# Patient Record
Sex: FEMALE | Race: BLACK OR AFRICAN AMERICAN | NOT HISPANIC OR LATINO | Employment: FULL TIME | URBAN - METROPOLITAN AREA
[De-identification: names, ages, dates, MRNs, and addresses within clinical notes are randomized per-mention and may not be internally consistent; named-entity substitution may affect disease eponyms.]

---

## 2018-12-15 ENCOUNTER — HOSPITAL ENCOUNTER (EMERGENCY)
Facility: OTHER | Age: 28
Discharge: HOME | End: 2018-12-15
Attending: EMERGENCY MEDICINE
Payer: MEDICAID

## 2018-12-15 VITALS
HEIGHT: 61 IN | RESPIRATION RATE: 18 BRPM | DIASTOLIC BLOOD PRESSURE: 78 MMHG | OXYGEN SATURATION: 99 % | WEIGHT: 125 LBS | BODY MASS INDEX: 23.6 KG/M2 | TEMPERATURE: 98 F | SYSTOLIC BLOOD PRESSURE: 120 MMHG | HEART RATE: 80 BPM

## 2018-12-15 DIAGNOSIS — S50.01XA CONTUSION OF RIGHT ELBOW, INITIAL ENCOUNTER: Primary | ICD-10-CM

## 2018-12-15 DIAGNOSIS — M79.603 ARM PAIN: ICD-10-CM

## 2018-12-15 LAB
B-HCG UR QL: NEGATIVE
CTP QC/QA: YES

## 2018-12-15 PROCEDURE — 81025 URINE PREGNANCY TEST: CPT | Performed by: EMERGENCY MEDICINE

## 2018-12-15 PROCEDURE — 25000003 PHARM REV CODE 250: Performed by: EMERGENCY MEDICINE

## 2018-12-15 PROCEDURE — 99283 EMERGENCY DEPT VISIT LOW MDM: CPT | Mod: 25

## 2018-12-15 RX ORDER — IBUPROFEN 400 MG/1
800 TABLET ORAL
Status: COMPLETED | OUTPATIENT
Start: 2018-12-15 | End: 2018-12-15

## 2018-12-15 RX ORDER — IBUPROFEN 600 MG/1
600 TABLET ORAL EVERY 6 HOURS PRN
Qty: 20 TABLET | Refills: 0 | OUTPATIENT
Start: 2018-12-15 | End: 2020-03-07

## 2018-12-15 RX ADMIN — IBUPROFEN 800 MG: 400 TABLET, FILM COATED ORAL at 03:12

## 2018-12-15 NOTE — ED PROVIDER NOTES
Encounter Date: 12/15/2018    SCRIBE #1 NOTE: IMichel, am scribing for, and in the presence of, Dr. Oneil.       History     Chief Complaint   Patient presents with    Arm Pain     pt reports falling today and hitting R elbow; pt reports swelling and pain and trouble moving R arm; denies LOC from fall; no bruising or skin abrasions noted     A 28-year-old female complains of a sudden onset of right elbow pain that started approximately 4 hr prior to arrival and has been constant after she had a fall out ice skating rink.  Describes as sharp pain worse with movement.  The still has good  strength.  Denies any other symptoms.          Review of patient's allergies indicates:  No Known Allergies  History reviewed. No pertinent past medical history.  History reviewed. No pertinent surgical history.  History reviewed. No pertinent family history.  Social History     Tobacco Use    Smoking status: Never Smoker    Smokeless tobacco: Never Used   Substance Use Topics    Alcohol use: Yes     Frequency: Never     Comment: occasional    Drug use: Not on file     Review of Systems   Constitutional: Negative for activity change and appetite change.   HENT: Negative for rhinorrhea.    Respiratory: Negative for shortness of breath.    Cardiovascular: Negative for chest pain.   Musculoskeletal:        Positive for right elbow pain       Physical Exam     Initial Vitals [12/15/18 0210]   BP Pulse Resp Temp SpO2   118/73 94 18 99.4 °F (37.4 °C) 100 %      MAP       --         Physical Exam    Nursing note and vitals reviewed.  Constitutional: She appears well-developed and well-nourished. She is not diaphoretic. No distress.   HENT:   Head: Normocephalic and atraumatic.   Eyes: Conjunctivae are normal.   Pulmonary/Chest: No respiratory distress.   Musculoskeletal: Normal range of motion. She exhibits tenderness. She exhibits no edema.   Tenderness to palpation of the lateral epicondyl. Holding arm in a  pronated position. Pain with supination and extension.  Good radial pulse.  Warm extremity.  Neurovascularly intact.   Neurological: She is alert and oriented to person, place, and time.   Skin: Skin is warm and dry. No rash and no abscess noted. No erythema. No pallor.   Psychiatric: She has a normal mood and affect. Her behavior is normal. Judgment and thought content normal.         ED Course   Procedures  Labs Reviewed   POCT URINE PREGNANCY          Imaging Results          X-Ray Elbow Complete Right (Final result)  Result time 12/15/18 02:50:04    Final result by Alex Sanchez MD (12/15/18 02:50:04)                 Impression:      No displaced fracture.      Electronically signed by: Alex Sanchez MD  Date:    12/15/2018  Time:    02:50             Narrative:    EXAMINATION:  XR ELBOW COMPLETE 3 VIEW RIGHT    CLINICAL HISTORY:  Pain in arm, unspecified.    TECHNIQUE:  AP, lateral, and oblique views of the right elbow were performed.    COMPARISON:  None.    FINDINGS:  No evidence of acute fracture or dislocation.  Soft tissues are symmetric.  No large joint effusion.  No radiopaque foreign body.                              X-Rays:   Independently Interpreted Readings:   Other Readings:  Right elbow: No fracture or dislocation.    Medical Decision Making:   ED Management:  A 28-year-old female with right elbow pain. No obvious deformity noted.  X-ray shows no fracture dislocation.  Likely contusion.  Will discharge with a sling and anti-inflammatories to follow up in 1 week with Orthopedics if no improvement.    Patient discharged home in stable condition. Diagnosis and treatment plan explained to patient. I have answered all questions and the patient is satisfied with the plan of care. The patient demonstrates understanding of the care plan. This is the extent to the patients complaints today here in the emergency department.            Scribe Attestation:   Scribe #1: I performed the above scribed  service and the documentation accurately describes the services I performed. I attest to the accuracy of the note.    Attending Attestation:           Physician Attestation for Scribe:  Physician Attestation Statement for Scribe #1: I, Dr. Oneil, reviewed documentation, as scribed by Michel Muller in my presence, and it is both accurate and complete.                    Clinical Impression:     1. Contusion of right elbow, initial encounter    2. Arm pain                                   Carroll Oneil, DO  12/15/18 0319

## 2018-12-15 NOTE — ED TRIAGE NOTES
Aurea Knapp, a 28 y.o. female presents to the ED    Triage note:  Chief Complaint   Patient presents with    Arm Pain     pt reports falling today and hitting R elbow; pt reports swelling and pain and trouble moving R arm; denies LOC from fall; no bruising or skin abrasions noted     Review of patient's allergies indicates:  No Known Allergies  No past medical history on file.      Two patient identifiers have been checked and are correct.      Appearance: Pt awake, alert & oriented to person, place & time. Pt in no acute distress at present time. Pt is clean and well groomed with clothes appropriately fastened.   Skin: Skin warm, dry & intact. Color consistent with ethnicity. Mucous membranes moist. No breakdown or brusing noted.   Musculoskeletal: Patient moving all extremities well, except for R elbow; pt reports falling sometime yesterday and hitting R elbow. Reports pain and swelling to R elbow.  Respiratory: Respirations spontaneous, even, and non-labored. Visible chest rise noted. Airway is open and patent. No accessory muscle use noted. Denies SOB.  Neurologic: Sensation is intact. Speech is clear and appropriate. Eyes open spontaneously, behavior appropriate to situation, follows commands, facial expression symmetrical, bilateral hand grasp equal and even, purposeful motor response noted. Denies HA.  Cardiac: All peripheral pulses present. No Bilateral lower extremity edema. Cap refill is <3 seconds. Denies CP.  Abdomen: Abdomen soft, non-tender to palpation. Denies NVD.  : Pt reports no dysuria or hematuria.

## 2020-03-07 ENCOUNTER — HOSPITAL ENCOUNTER (EMERGENCY)
Facility: OTHER | Age: 30
Discharge: HOME | End: 2020-03-07
Attending: EMERGENCY MEDICINE
Payer: MEDICAID

## 2020-03-07 VITALS
OXYGEN SATURATION: 99 % | HEIGHT: 61 IN | WEIGHT: 155 LBS | SYSTOLIC BLOOD PRESSURE: 141 MMHG | HEART RATE: 84 BPM | RESPIRATION RATE: 18 BRPM | DIASTOLIC BLOOD PRESSURE: 83 MMHG | TEMPERATURE: 99 F | BODY MASS INDEX: 29.27 KG/M2

## 2020-03-07 DIAGNOSIS — S29.019A THORACIC MYOFASCIAL STRAIN, INITIAL ENCOUNTER: Primary | ICD-10-CM

## 2020-03-07 LAB
B-HCG UR QL: NEGATIVE
BACTERIA #/AREA URNS HPF: ABNORMAL /HPF
BILIRUB UR QL STRIP: NEGATIVE
CLARITY UR: CLEAR
COLOR UR: YELLOW
CTP QC/QA: YES
GLUCOSE UR QL STRIP: NEGATIVE
HGB UR QL STRIP: ABNORMAL
KETONES UR QL STRIP: NEGATIVE
LEUKOCYTE ESTERASE UR QL STRIP: NEGATIVE
MICROSCOPIC COMMENT: ABNORMAL
NITRITE UR QL STRIP: NEGATIVE
PH UR STRIP: 7 [PH] (ref 5–8)
PROT UR QL STRIP: NEGATIVE
RBC #/AREA URNS HPF: 15 /HPF (ref 0–4)
SP GR UR STRIP: 1.01 (ref 1–1.03)
URN SPEC COLLECT METH UR: ABNORMAL
UROBILINOGEN UR STRIP-ACNC: NEGATIVE EU/DL

## 2020-03-07 PROCEDURE — 99284 EMERGENCY DEPT VISIT MOD MDM: CPT | Mod: 25

## 2020-03-07 PROCEDURE — 81025 URINE PREGNANCY TEST: CPT | Performed by: EMERGENCY MEDICINE

## 2020-03-07 PROCEDURE — 81000 URINALYSIS NONAUTO W/SCOPE: CPT

## 2020-03-07 RX ORDER — METHOCARBAMOL 500 MG/1
1000 TABLET, FILM COATED ORAL 3 TIMES DAILY PRN
Qty: 20 TABLET | Refills: 0 | Status: SHIPPED | OUTPATIENT
Start: 2020-03-07 | End: 2020-03-12

## 2020-03-07 RX ORDER — IBUPROFEN 600 MG/1
600 TABLET ORAL EVERY 6 HOURS PRN
Qty: 30 TABLET | Refills: 0 | Status: SHIPPED | OUTPATIENT
Start: 2020-03-07 | End: 2020-03-13 | Stop reason: ALTCHOICE

## 2020-03-07 NOTE — ED PROVIDER NOTES
Encounter Date: 3/7/2020    SCRIBE #1 NOTE: IKathy, kori scribing for, and in the presence of, Dr. Aldana.       History     Chief Complaint   Patient presents with    Back Pain     Reports midline back pain for years, had a recent trip and fall, reports pain has worsened     Time seen by provider: 9:56 AM    This is a 29 y.o. female who presents with complaint of back pain that 3 days ago. The pain is located to bilateral lower back. Patient denies any falls or trauma preceding the pain. She notes having a mechanical fall yesterday in which she missed a step and fell onto her left side. She states which worsened the back pain. She reports she has been having back pain intermittent for years. She denies any numbness, tingling, or weakness. Denies fever, chills, nausea, vomiting, cough, congestion, chest pain, abdominal pain, or dysuria. She denies any relief with use of ibuprofen, tylenol, or excedrin. Pt admits to use of tobacco, and states she goes through one pack of cigarettes every 3 days.    The history is provided by the patient.     Review of patient's allergies indicates:  No Known Allergies  History reviewed. No pertinent past medical history.  History reviewed. No pertinent surgical history.  History reviewed. No pertinent family history.  Social History     Tobacco Use    Smoking status: Never Smoker    Smokeless tobacco: Never Used   Substance Use Topics    Alcohol use: Yes     Frequency: Never     Comment: occasional    Drug use: Not on file     Review of Systems   Constitutional: Negative for chills and fever.   HENT: Negative for congestion, sore throat and trouble swallowing.    Eyes: Negative for photophobia and visual disturbance.   Respiratory: Negative for cough and shortness of breath.    Cardiovascular: Negative for chest pain.   Gastrointestinal: Negative for abdominal pain, nausea and vomiting.   Genitourinary: Negative for dysuria and hematuria.   Musculoskeletal: Positive for  back pain. Negative for neck pain.   Skin: Negative for rash and wound.   Neurological: Negative for weakness, numbness and headaches.   Psychiatric/Behavioral: Negative.        Physical Exam     Initial Vitals [03/07/20 0909]   BP Pulse Resp Temp SpO2   118/89 95 18 98.4 °F (36.9 °C) 99 %      MAP       --         Physical Exam    Nursing note and vitals reviewed.  Constitutional: She appears well-developed and well-nourished. No distress.   HENT:   Head: Normocephalic and atraumatic.   Eyes: EOM are normal.   Neck: Normal range of motion.   Pulmonary/Chest: No respiratory distress.   Abdominal: Soft. There is no tenderness. There is no rebound and no guarding.   Musculoskeletal: Normal range of motion.   Paraspinal thoracic tenderness bilaterally. No focal midline pain. Normal ROM and strength all 4 extremities. NVID.   Neurological: She is alert and oriented to person, place, and time. She has normal strength. No sensory deficit.   Skin: No rash noted.   Psychiatric: She has a normal mood and affect. Thought content normal.         ED Course   Procedures  Labs Reviewed   URINALYSIS, REFLEX TO URINE CULTURE - Abnormal; Notable for the following components:       Result Value    Occult Blood UA 3+ (*)     All other components within normal limits    Narrative:     Preferred Collection Type->Urine, Clean Catch   URINALYSIS MICROSCOPIC - Abnormal; Notable for the following components:    RBC, UA 15 (*)     Bacteria Moderate (*)     All other components within normal limits    Narrative:     Preferred Collection Type->Urine, Clean Catch   POCT URINE PREGNANCY          Imaging Results    None          Medical Decision Making:   History:   Old Medical Records: I decided to obtain old medical records.  Clinical Tests:   Lab Tests: Ordered and Reviewed            Scribe Attestation:   Scribe #1: I performed the above scribed service and the documentation accurately describes the services I performed. I attest to the  accuracy of the note.    Attending Attestation:           Physician Attestation for Scribe:  Physician Attestation Statement for Scribe #1: I, Dr. Aldana, reviewed documentation, as scribed by Kathy Hussein in my presence, and it is both accurate and complete.                    Patient presents with low back pain. She denies any significant trauma, thinks that the pain started after a lot of physical movement at work she has had intermittent similar pains over the past years.  She also had a minor fall not directly impacting the area which further exacerbated this on exam she does not have any significant focal bony tenderness to warrant imaging.  Treat with anti-inflammatory, muscle relaxant.  Return precautions discussed.  Encouraged follow-up with primary care, especially if symptoms persist.               Clinical Impression:     1. Thoracic myofascial strain, initial encounter              ED Disposition Condition    Discharge Stable        ED Prescriptions     Medication Sig Dispense Start Date End Date Auth. Provider    ibuprofen (ADVIL,MOTRIN) 600 MG tablet Take 1 tablet (600 mg total) by mouth every 6 (six) hours as needed. 30 tablet 3/7/2020  Herrera Aldana II, MD    methocarbamol (ROBAXIN) 500 MG Tab Take 2 tablets (1,000 mg total) by mouth 3 (three) times daily as needed. 20 tablet 3/7/2020 3/12/2020 Herrera Aldana II, MD        Follow-up Information     Follow up With Specialties Details Why Contact Info    Primary Care Clinic  Schedule an appointment as soon as possible for a visit in 5 days                                       Herrera Aldana II, MD  03/07/20 1918

## 2020-03-07 NOTE — ED TRIAGE NOTES
"Pt presents to ED w/ complaints of midline back pain. She reports pain has been presents for "years", exacerbated by recent trip and fall. She rates pain 9 out of 10. Pt is ambulatory w/ steady gait, is AAO x 3, answers questions appropriately  "

## 2020-03-13 ENCOUNTER — HOSPITAL ENCOUNTER (EMERGENCY)
Facility: OTHER | Age: 30
Discharge: HOME | End: 2020-03-13
Attending: EMERGENCY MEDICINE
Payer: MEDICAID

## 2020-03-13 VITALS
HEART RATE: 98 BPM | OXYGEN SATURATION: 99 % | HEIGHT: 61 IN | DIASTOLIC BLOOD PRESSURE: 81 MMHG | BODY MASS INDEX: 28.32 KG/M2 | RESPIRATION RATE: 18 BRPM | WEIGHT: 150 LBS | TEMPERATURE: 99 F | SYSTOLIC BLOOD PRESSURE: 124 MMHG

## 2020-03-13 DIAGNOSIS — R07.9 CHEST PAIN: ICD-10-CM

## 2020-03-13 DIAGNOSIS — R07.89 COSTOCHONDRAL CHEST PAIN: Primary | ICD-10-CM

## 2020-03-13 LAB
B-HCG UR QL: NEGATIVE
CTP QC/QA: YES

## 2020-03-13 PROCEDURE — 81025 URINE PREGNANCY TEST: CPT | Performed by: EMERGENCY MEDICINE

## 2020-03-13 PROCEDURE — 93005 ELECTROCARDIOGRAM TRACING: CPT

## 2020-03-13 PROCEDURE — 99284 EMERGENCY DEPT VISIT MOD MDM: CPT | Mod: 25

## 2020-03-13 RX ORDER — NAPROXEN 500 MG/1
500 TABLET ORAL EVERY 12 HOURS PRN
Qty: 14 TABLET | Refills: 0 | Status: SHIPPED | OUTPATIENT
Start: 2020-03-13 | End: 2020-03-20

## 2020-03-13 NOTE — ED PROVIDER NOTES
Encounter Date: 3/13/2020       History     Chief Complaint   Patient presents with    Chest Pain     Pt to ER with c/o midsternal CP that started last night. PT reports sudden onset. describes it as stabbing/heavey. Pt denies SOB, Weakness, dizziness. Pt with no medical hx.      Patient is a 29-year-old female with no pertinent past medical history presents to the emergency department with chest pain.  Patient reports a stabbing pain to the midsternal region.  She states this began last night.  She states the pain worsened while at work today.  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  She denies injury.  She denies associated cough, fever, shortness of breath, dizziness or palpitations.  She has not taken any medication for her symptoms.  She does admit to smoking cigarettes daily.  She denies family cardiac history.  She denies nausea, vomiting, or diaphoresis.    The history is provided by the patient.     Review of patient's allergies indicates:  No Known Allergies  No past medical history on file.  No past surgical history on file.  No family history on file.  Social History     Tobacco Use    Smoking status: Never Smoker    Smokeless tobacco: Never Used   Substance Use Topics    Alcohol use: Yes     Frequency: Never     Comment: occasional    Drug use: Not on file     Review of Systems   Constitutional: Negative for chills, diaphoresis and fever.   HENT: Negative for congestion and sore throat.    Respiratory: Negative for cough and shortness of breath.    Cardiovascular: Positive for chest pain. Negative for palpitations and leg swelling.   Gastrointestinal: Negative for abdominal pain, nausea and vomiting.   Genitourinary: Negative for dysuria.   Musculoskeletal: Negative for arthralgias.   Skin: Negative for rash.   Allergic/Immunologic: Negative for immunocompromised state.   Neurological: Negative for dizziness, weakness and headaches.       Physical Exam     Initial Vitals [03/13/20 1020]   BP Pulse Resp  Temp SpO2   124/81 98 18 99 °F (37.2 °C) 99 %      MAP       --         Physical Exam    Vitals reviewed.  Constitutional: Vital signs are normal. She is cooperative. No distress.   HENT:   Head: Normocephalic and atraumatic.   Eyes: Conjunctivae and EOM are normal.   Neck: Normal range of motion. Neck supple.   Cardiovascular: Normal rate, regular rhythm and intact distal pulses.   Pulmonary/Chest: Breath sounds normal. No respiratory distress. She has no wheezes. She has no rhonchi. She has no rales. She exhibits tenderness (External pain with palpation.  No crepitus).   Abdominal: Soft. Bowel sounds are normal. There is no tenderness.   Neurological: She is alert and oriented to person, place, and time. GCS eye subscore is 4. GCS verbal subscore is 5. GCS motor subscore is 6.   Skin: Skin is warm and dry. No rash noted.         ED Course   Procedures  Labs Reviewed   POCT URINE PREGNANCY     EKG Readings: (Independently Interpreted)   Normal sinus rhythm at a rate of 93 beats per minute.  No STEMI.  No ischemic changes.     ECG Results          EKG 12-lead (In process)  Result time 03/13/20 12:22:40    In process by Interface, Lab In [facility] (03/13/20 12:22:40)                 Narrative:    Test Reason : R07.9,    Vent. Rate : 093 BPM     Atrial Rate : 093 BPM     P-R Int : 130 ms          QRS Dur : 076 ms      QT Int : 364 ms       P-R-T Axes : 085 075 061 degrees     QTc Int : 452 ms    Normal sinus rhythm  Normal ECG      Referred By: [email or web address]   SELF           Confirmed By:                             Imaging Results          X-Ray Chest PA And Lateral (Final result)  Result time 03/13/20 12:40:15    Final result by Hipolito Daniel MD (03/13/20 12:40:15)                 Impression:      No radiographic acute intrathoracic process seen.      Electronically signed by: Hipolito Daniel MD  Date:    03/13/2020  Time:    12:40             Narrative:    EXAMINATION:  XR CHEST PA AND LATERAL    CLINICAL HISTORY:  Chest  pain, unspecified    TECHNIQUE:  PA and lateral views of the chest were performed.    COMPARISON:  None    FINDINGS:  Hair artifact overlies the lower neck and left lung apex on the frontal view somewhat limiting evaluation, noting that this region appears clear on the lateral view.The lungs are otherwise symmetrically well expanded without large consolidation, pleural effusion or pneumothorax.  Pulmonary vasculature is within normal limits.    The cardiac silhouette is normal in size. The hilar and mediastinal contours are unremarkable.    Bones are intact.                                 Medical Decision Making:   Initial Assessment:   Urgent evaluation of a 29 y.o. female presenting to the emergency department complaining of chest pain x2 days. Patient is afebrile, nontoxic appearing and hemodynamically stable.  -patient with atypical chest pain.  Low risk factors for ACS aside from smoking cigarettes.  -patient's pain is reproducible on exam.  I do believe this is likely musculoskeletal versus costochondritic pain.  -she is PERC negative.  ED Management:  -EKG is normal sinus rhythm.  Chest x-ray reveals no acute cardiopulmonary process.  -patient will be sent home with anti-inflammatories for her pain.  She is advised on supportive care.  She is given strict return precautions to the emergency department and given information to establish care with primary care.  Other:   I have discussed this case with another health care provider.                                 Clinical Impression:     1. Costochondral chest pain    2. Chest pain            ED Disposition Condition    Discharge Stable        ED Prescriptions     Medication Sig Dispense Start Date End Date Auth. Provider    naproxen (NAPROSYN) 500 MG tablet Take 1 tablet (500 mg total) by mouth every 12 (twelve) hours as needed (pain). 14 tablet 3/13/2020 3/20/2020 Jorden Ohara PA-C        Follow-up Information     Follow up With Specialties Details Why  Contact Info    [facility]  Schedule an appointment as soon as possible for a visit   [address] [address]  [phone]      [facility]  If symptoms worsen [address] [id number] [phone]                                     Jorden Ohara PA-C  03/13/20 1334